# Patient Record
Sex: MALE | Race: OTHER
[De-identification: names, ages, dates, MRNs, and addresses within clinical notes are randomized per-mention and may not be internally consistent; named-entity substitution may affect disease eponyms.]

---

## 2022-08-21 ENCOUNTER — HOSPITAL ENCOUNTER (INPATIENT)
Dept: HOSPITAL 12 - ER | Age: 47
LOS: 3 days | Discharge: HOME HEALTH | DRG: 720 | End: 2022-08-24
Payer: MEDICAID

## 2022-08-21 VITALS — BODY MASS INDEX: 31.22 KG/M2 | HEIGHT: 68 IN | WEIGHT: 206 LBS

## 2022-08-21 VITALS — SYSTOLIC BLOOD PRESSURE: 106 MMHG | DIASTOLIC BLOOD PRESSURE: 71 MMHG

## 2022-08-21 DIAGNOSIS — S81.801A: ICD-10-CM

## 2022-08-21 DIAGNOSIS — L97.819: ICD-10-CM
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
## 2022-08-21 DIAGNOSIS — L02.414: ICD-10-CM

## 2022-08-21 DIAGNOSIS — E11.621: ICD-10-CM

## 2022-08-21 DIAGNOSIS — Z72.89: ICD-10-CM

## 2022-08-21 DIAGNOSIS — B95.0: ICD-10-CM

## 2022-08-21 DIAGNOSIS — L03.114: ICD-10-CM

## 2022-08-21 DIAGNOSIS — D68.69: ICD-10-CM

## 2022-08-21 DIAGNOSIS — Z20.822: ICD-10-CM

## 2022-08-21 DIAGNOSIS — E11.40: ICD-10-CM

## 2022-08-21 DIAGNOSIS — E11.622: ICD-10-CM

## 2022-08-21 DIAGNOSIS — A41.9: Primary | ICD-10-CM
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
## 2022-08-21 DIAGNOSIS — L03.115: ICD-10-CM
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
## 2022-08-21 LAB
ALP SERPL-CCNC: 150 U/L (ref 50–136)
ALT SERPL W/O P-5'-P-CCNC: 18 U/L (ref 16–63)
AST SERPL-CCNC: 16 U/L (ref 15–37)
BILIRUB DIRECT SERPL-MCNC: 0.2 MG/DL (ref 0–0.2)
BILIRUB SERPL-MCNC: 0.8 MG/DL (ref 0.2–1)
BUN SERPL-MCNC: 14 MG/DL (ref 7–18)
CHLORIDE SERPL-SCNC: 92 MMOL/L (ref 98–107)
CO2 SERPL-SCNC: 28 MMOL/L (ref 21–32)
CREAT SERPL-MCNC: 1.2 MG/DL (ref 0.6–1.3)
GLUCOSE SERPL-MCNC: 357 MG/DL (ref 74–106)
HCT VFR BLD AUTO: 37.2 % (ref 36.7–47.1)
MCH RBC QN AUTO: 30.3 UUG (ref 23.8–33.4)
MCV RBC AUTO: 89.2 FL (ref 73–96.2)
PLATELET # BLD AUTO: 253 K/UL (ref 152–348)
POTASSIUM SERPL-SCNC: 3.8 MMOL/L (ref 3.5–5.1)
WS STN SPEC: 8.2 G/DL (ref 6.4–8.2)

## 2022-08-21 PROCEDURE — A4663 DIALYSIS BLOOD PRESSURE CUFF: HCPCS

## 2022-08-21 PROCEDURE — G0378 HOSPITAL OBSERVATION PER HR: HCPCS

## 2022-08-21 RX ADMIN — DEXTROSE SCH MLS/HR: 50 INJECTION, SOLUTION INTRAVENOUS at 17:20

## 2022-08-21 RX ADMIN — HYDROCODONE BITARTRATE AND ACETAMINOPHEN PRN TAB: 5; 325 TABLET ORAL at 10:24

## 2022-08-21 RX ADMIN — SODIUM CHLORIDE PRN MLS/HR: 0.9 INJECTION, SOLUTION INTRAVENOUS at 21:22

## 2022-08-21 RX ADMIN — Medication SCH EACH: at 17:11

## 2022-08-21 RX ADMIN — Medication SCH EACH: at 21:18

## 2022-08-21 RX ADMIN — SODIUM CHLORIDE PRN UNIT: 9 INJECTION, SOLUTION INTRAVENOUS at 21:35

## 2022-08-21 RX ADMIN — HYDROCODONE BITARTRATE AND ACETAMINOPHEN PRN TAB: 5; 325 TABLET ORAL at 21:30

## 2022-08-21 RX ADMIN — PIPERACILLIN SODIUM AND TAZOBACTAM SODIUM SCH MLS/HR: .375; 3 INJECTION, POWDER, LYOPHILIZED, FOR SOLUTION INTRAVENOUS at 21:19

## 2022-08-21 RX ADMIN — SODIUM CHLORIDE PRN UNIT: 9 INJECTION, SOLUTION INTRAVENOUS at 17:30

## 2022-08-21 RX ADMIN — ENOXAPARIN SODIUM SCH MG: 40 INJECTION SUBCUTANEOUS at 09:25

## 2022-08-21 NOTE — NUR
REPORT GIVEN BY CHARGE NURSE TO INCOMING CHARGE NURSE. PATIENT DENIES 
DISCOMFORT. VANCO 1 GM AND X 2 LITER NS BOLUS INFUSED, VOIDED X 1 ON SHIFT. 
TEMP DOWN TO 98.0 FROM 100.2, PATIENT DENIES BEING DIABETIC, 352 BLOOD GLUCOSE 
AND ED [id number] AWARE.

## 2022-08-21 NOTE — NUR
PATIENT IN ED FROM HOME WITH REPORT OF RIGHT LE WOUND 2ND TO SPIDER BITE X 5 
DAYS AGO.RIGHT LEG SWOLLEN ,HOT TO TOUCH AND TENDER WITH PURULENT DRAINAGE 
RUNNING DOWN LEG. SEEN BY DR. HURST AND SET UP FOR I&D.

## 2022-08-22 VITALS — SYSTOLIC BLOOD PRESSURE: 100 MMHG | DIASTOLIC BLOOD PRESSURE: 68 MMHG

## 2022-08-22 VITALS — DIASTOLIC BLOOD PRESSURE: 58 MMHG | SYSTOLIC BLOOD PRESSURE: 94 MMHG

## 2022-08-22 VITALS — DIASTOLIC BLOOD PRESSURE: 62 MMHG | SYSTOLIC BLOOD PRESSURE: 94 MMHG

## 2022-08-22 VITALS — SYSTOLIC BLOOD PRESSURE: 120 MMHG | DIASTOLIC BLOOD PRESSURE: 71 MMHG

## 2022-08-22 LAB
AMPHETAMINES UR QL SCN>1000 NG/ML: POSITIVE
APPEARANCE UR: CLEAR
BILIRUB UR QL STRIP: NEGATIVE
BUN SERPL-MCNC: 9 MG/DL (ref 7–18)
CHLORIDE SERPL-SCNC: 98 MMOL/L (ref 98–107)
CHOLEST SERPL-MCNC: 126 MG/DL (ref ?–200)
CO2 SERPL-SCNC: 27 MMOL/L (ref 21–32)
COCAINE UR QL SCN: NEGATIVE
COLOR UR: YELLOW
CREAT SERPL-MCNC: 1 MG/DL (ref 0.6–1.3)
DEPRECATED SQUAMOUS URNS QL MICRO: (no result) /HPF
GLUCOSE SERPL-MCNC: 299 MG/DL (ref 74–106)
GLUCOSE UR STRIP-MCNC: (no result) MG/DL
HCT VFR BLD AUTO: 33 % (ref 36.7–47.1)
HDLC SERPL-MCNC: 46 MG/DL (ref 40–60)
HGB UR QL STRIP: NEGATIVE
KETONES UR STRIP-MCNC: NEGATIVE MG/DL
LEUKOCYTE ESTERASE UR QL STRIP: NEGATIVE
MAGNESIUM SERPL-MCNC: 1.7 MG/DL (ref 1.8–2.4)
MCH RBC QN AUTO: 30.2 UUG (ref 23.8–33.4)
MCV RBC AUTO: 89.4 FL (ref 73–96.2)
NITRITE UR QL STRIP: NEGATIVE
OPIATES UR QL SCN: POSITIVE
PCP UR QL SCN>25 NG/ML: NEGATIVE
PH UR STRIP: 6 [PH] (ref 5–8)
PLATELET # BLD AUTO: 227 K/UL (ref 152–348)
POTASSIUM SERPL-SCNC: 3.7 MMOL/L (ref 3.5–5.1)
RBC #/AREA URNS HPF: (no result) /HPF (ref 0–3)
SP GR UR STRIP: 1.01 (ref 1–1.03)
THC UR QL SCN>50 NG/ML: NEGATIVE
TRIGL SERPL-MCNC: 59 MG/DL (ref 30–150)
UROBILINOGEN UR STRIP-MCNC: 4 E.U./DL
WBC #/AREA URNS HPF: (no result) /HPF
WBC #/AREA URNS HPF: (no result) /HPF (ref 0–3)

## 2022-08-22 RX ADMIN — Medication SCH EACH: at 21:04

## 2022-08-22 RX ADMIN — SODIUM CHLORIDE PRN UNIT: 9 INJECTION, SOLUTION INTRAVENOUS at 17:20

## 2022-08-22 RX ADMIN — PIPERACILLIN SODIUM AND TAZOBACTAM SODIUM SCH MLS/HR: .375; 3 INJECTION, POWDER, LYOPHILIZED, FOR SOLUTION INTRAVENOUS at 17:11

## 2022-08-22 RX ADMIN — SODIUM CHLORIDE PRN UNIT: 9 INJECTION, SOLUTION INTRAVENOUS at 08:03

## 2022-08-22 RX ADMIN — Medication SCH EACH: at 06:08

## 2022-08-22 RX ADMIN — PANTOPRAZOLE SODIUM SCH MG: 40 TABLET, DELAYED RELEASE ORAL at 06:08

## 2022-08-22 RX ADMIN — SODIUM CHLORIDE PRN UNIT: 9 INJECTION, SOLUTION INTRAVENOUS at 12:17

## 2022-08-22 RX ADMIN — SODIUM CHLORIDE PRN UNIT: 9 INJECTION, SOLUTION INTRAVENOUS at 21:06

## 2022-08-22 RX ADMIN — ENOXAPARIN SODIUM SCH MG: 40 INJECTION SUBCUTANEOUS at 08:04

## 2022-08-22 RX ADMIN — Medication SCH EACH: at 12:15

## 2022-08-22 RX ADMIN — DEXTROSE SCH MLS/HR: 50 INJECTION, SOLUTION INTRAVENOUS at 20:30

## 2022-08-22 RX ADMIN — PIPERACILLIN SODIUM AND TAZOBACTAM SODIUM SCH MLS/HR: .375; 3 INJECTION, POWDER, LYOPHILIZED, FOR SOLUTION INTRAVENOUS at 23:55

## 2022-08-22 RX ADMIN — PIPERACILLIN SODIUM AND TAZOBACTAM SODIUM SCH MLS/HR: .375; 3 INJECTION, POWDER, LYOPHILIZED, FOR SOLUTION INTRAVENOUS at 12:17

## 2022-08-22 RX ADMIN — Medication SCH EACH: at 17:18

## 2022-08-22 RX ADMIN — SODIUM CHLORIDE PRN MLS/HR: 0.9 INJECTION, SOLUTION INTRAVENOUS at 12:17

## 2022-08-22 RX ADMIN — METFORMIN HYDROCHLORIDE SCH MG: 500 TABLET ORAL at 17:18

## 2022-08-22 RX ADMIN — DEXTROSE SCH MLS/HR: 50 INJECTION, SOLUTION INTRAVENOUS at 05:34

## 2022-08-22 RX ADMIN — PIPERACILLIN SODIUM AND TAZOBACTAM SODIUM SCH MLS/HR: .375; 3 INJECTION, POWDER, LYOPHILIZED, FOR SOLUTION INTRAVENOUS at 02:21

## 2022-08-22 NOTE — NUR
NOTIFIED EILEEN MONCADA PROVIDER RE BLOOD SUGAR AT THIS TIME  AND PATIENT IS ON MILD 
SLIDING SCALE AND HE STATED ITS OKAY FOR NOW SINCE HE HAS ADDED METFORMIN AND GLIPIZIDE AND 
NOTED.

## 2022-08-22 NOTE — NUR
RECEIVED PATIENT IN BED AWAKE ALERT AND ORIENTED DENIES PAIN OR DISCOMFORTS AT THIS TIME 
INSULIN GIVEN PER SLIDING SCALE NO S/S OF HYPERGLYCEMIC REACTIONS AT THIS TIME NO S/S OF 
ADVERSE OR ALLERGIC REACTIONS FROM ATB AS ORDERED DRESSING INTACT TO BOTH ELBOWS AND RIGHT 
ARM MADE COMFORTABLE WILL CONTINUE TO OBSERVE.

## 2022-08-22 NOTE — NUR
BLOOD SUGAR AT THIS TIME  WITH REGULAR INSULIN PER COVERAGE NO S/S OF 
HYPO/HYPERGLYCEMIC REACTIONS DENIES PAIN NOR DISCOMFORTS AT THIS TIME RIGHT LOWER EXT 
ELEVATED ON A PILLOW WILL CONTINUE TO OBSERVE.

## 2022-08-22 NOTE — NUR
Admitted to room 318; admission procedures done;  wound care done; VSS; continue to monitor; 
plan of care initiated

## 2022-08-22 NOTE — NUR
DRESSING CHANGED ON RIGHT LOWER LEG AT THE SITE OF INFECTION. CLEANED WITH NORMAL SALINE AND 
APPLIED XEROFOAM AND GAUZE. PT STATED THAT IT DOESN'T HURT AS MUCH. MAINTAINED ELEVATION OF 
RIGHT LOWER LEG.

## 2022-08-23 VITALS — DIASTOLIC BLOOD PRESSURE: 78 MMHG | SYSTOLIC BLOOD PRESSURE: 114 MMHG

## 2022-08-23 VITALS — DIASTOLIC BLOOD PRESSURE: 71 MMHG | SYSTOLIC BLOOD PRESSURE: 105 MMHG

## 2022-08-23 VITALS — DIASTOLIC BLOOD PRESSURE: 65 MMHG | SYSTOLIC BLOOD PRESSURE: 102 MMHG

## 2022-08-23 VITALS — DIASTOLIC BLOOD PRESSURE: 72 MMHG | SYSTOLIC BLOOD PRESSURE: 111 MMHG

## 2022-08-23 LAB
BUN SERPL-MCNC: 7 MG/DL (ref 7–18)
CHLORIDE SERPL-SCNC: 103 MMOL/L (ref 98–107)
CO2 SERPL-SCNC: 30 MMOL/L (ref 21–32)
CREAT SERPL-MCNC: 0.9 MG/DL (ref 0.6–1.3)
GLUCOSE SERPL-MCNC: 177 MG/DL (ref 74–106)
HCT VFR BLD AUTO: 32.5 % (ref 36.7–47.1)
MAGNESIUM SERPL-MCNC: 1.8 MG/DL (ref 1.8–2.4)
MCH RBC QN AUTO: 29.8 UUG (ref 23.8–33.4)
MCV RBC AUTO: 89.2 FL (ref 73–96.2)
PLATELET # BLD AUTO: 260 K/UL (ref 152–348)
POTASSIUM SERPL-SCNC: 3.8 MMOL/L (ref 3.5–5.1)

## 2022-08-23 PROCEDURE — 0JBN0ZZ EXCISION OF RIGHT LOWER LEG SUBCUTANEOUS TISSUE AND FASCIA, OPEN APPROACH: ICD-10-PCS

## 2022-08-23 RX ADMIN — DEXTROSE SCH MLS/HR: 50 INJECTION, SOLUTION INTRAVENOUS at 20:43

## 2022-08-23 RX ADMIN — METFORMIN HYDROCHLORIDE SCH MG: 500 TABLET ORAL at 08:11

## 2022-08-23 RX ADMIN — Medication SCH EACH: at 13:35

## 2022-08-23 RX ADMIN — DEXTROSE SCH MLS/HR: 50 INJECTION, SOLUTION INTRAVENOUS at 13:57

## 2022-08-23 RX ADMIN — SODIUM CHLORIDE PRN MLS/HR: 0.9 INJECTION, SOLUTION INTRAVENOUS at 07:18

## 2022-08-23 RX ADMIN — Medication SCH EACH: at 11:38

## 2022-08-23 RX ADMIN — SODIUM CHLORIDE PRN UNIT: 9 INJECTION, SOLUTION INTRAVENOUS at 07:55

## 2022-08-23 RX ADMIN — METFORMIN HYDROCHLORIDE SCH MG: 500 TABLET ORAL at 17:24

## 2022-08-23 RX ADMIN — Medication SCH EACH: at 16:22

## 2022-08-23 RX ADMIN — SODIUM CHLORIDE PRN UNIT: 9 INJECTION, SOLUTION INTRAVENOUS at 11:39

## 2022-08-23 RX ADMIN — Medication SCH EACH: at 07:18

## 2022-08-23 RX ADMIN — Medication SCH ML: at 17:24

## 2022-08-23 RX ADMIN — PIPERACILLIN SODIUM AND TAZOBACTAM SODIUM SCH MLS/HR: .375; 3 INJECTION, POWDER, LYOPHILIZED, FOR SOLUTION INTRAVENOUS at 05:11

## 2022-08-23 RX ADMIN — PANTOPRAZOLE SODIUM SCH MG: 40 TABLET, DELAYED RELEASE ORAL at 06:40

## 2022-08-23 RX ADMIN — SODIUM CHLORIDE PRN MLS/HR: 0.9 INJECTION, SOLUTION INTRAVENOUS at 21:59

## 2022-08-23 RX ADMIN — SODIUM CHLORIDE PRN UNIT: 9 INJECTION, SOLUTION INTRAVENOUS at 16:20

## 2022-08-23 RX ADMIN — SODIUM CHLORIDE PRN UNIT: 9 INJECTION, SOLUTION INTRAVENOUS at 20:55

## 2022-08-23 RX ADMIN — Medication SCH ML: at 13:35

## 2022-08-23 RX ADMIN — DEXTROSE SCH MLS/HR: 50 INJECTION, SOLUTION INTRAVENOUS at 13:17

## 2022-08-23 RX ADMIN — ENOXAPARIN SODIUM SCH MG: 40 INJECTION SUBCUTANEOUS at 08:13

## 2022-08-23 RX ADMIN — DEXTROSE SCH MLS/HR: 50 INJECTION, SOLUTION INTRAVENOUS at 22:01

## 2022-08-23 RX ADMIN — Medication SCH EACH: at 20:54

## 2022-08-23 RX ADMIN — Medication SCH EACH: at 17:23

## 2022-08-23 RX ADMIN — HYDROCODONE BITARTRATE AND ACETAMINOPHEN PRN TAB: 5; 325 TABLET ORAL at 15:12

## 2022-08-23 NOTE — NUR
MEDICATED WITH [facility] FOR PAIN AS REQUESTED RIGHT LEG ELEVATED ON A PILLOW NOT IN DISTRESS 
WILL CONTINUE TO OBSERVE.

## 2022-08-23 NOTE — NUR
DR IZQUIERDO HERE AND SEEN PATIENT AND DID BESIDE DEBRIDEMENT AFTER CONSCENT SIGNED WITH ORDERS 
AND NOTED PATIENT TOLERATED WELL.

## 2022-08-23 NOTE — NUR
Social work consult was requested for a patient on medsu for substance abuse resources. 
Patient is 46-year-old  male admitted to the hospital for cellulitis. Patient is 
Korean speaking and SW had nurse, yessenia Warren. Patient is alert and oriented X3. 
Patient presents with anxious mood and congruent affect. Patient states his primary contact 
person is wife, Melissa Larose ([phone]) and she lives in [place]. Patient states they 
have a good relationship. Patient states that he lives at [address] [address] 
in an apartment with his 22-year-old daughter, Jing ([phone]).  Patient states that he 
is currently unemployed. Patient states he does not have any medical equipment at home and 
is not currently driving.  



Patient states that he has a history of alcohol abuse. The toxicology screening reports 
positive opiates and amphetamines. SW provided the patient resources for substance abuse 
from [address] [address] ([phone]), [facility] 
[address] [facility] [address] ([phone]), and [address] [address] ([phone]). SW also provided the number for alcoholics 
anonymous ([phone]). Patient appears in the ambivalent about treatment. Patient stated 
that he would potentially be interested in looking at treatment centers that the SW 
provided. SW placed the resources in the patients chart. Patient denies a history of a 
psychiatric diagnosis. Patient denies suicidal or homicidal ideation.  The discharge plan is 
for his daughter, Jing ([phone]) to take him to [address] [address] at 
discharge.

## 2022-08-23 NOTE — NUR
WOUND CARE CONSULT: PT PRESENTS WITH HEALING AREAS TO BILATERAL ELBOWS AND WOUND TO RT LOWER 
LEG, PRESENT ON ADMISSION. PACKING STRIP REMOVED FROM RT LOWER LEG. WOUND NOTED TO HAVE 
SEROSANGUINOUS DRAINAGE, NO ODOR BUT SOME REDNESS NOTED. DR OLIVAS NOTIFIED OF DPM CONSULT 
REQUEST. DISCUSSED WOUND CARE OF ELBOWS WITH NURSING STAFF. HEALED AREA NOTED TO LEFT 
BUTTOCK. MD IN AGREEMENT WITH PLAN OF CARE.

## 2022-08-24 VITALS — SYSTOLIC BLOOD PRESSURE: 109 MMHG | DIASTOLIC BLOOD PRESSURE: 70 MMHG

## 2022-08-24 VITALS — DIASTOLIC BLOOD PRESSURE: 75 MMHG | SYSTOLIC BLOOD PRESSURE: 113 MMHG

## 2022-08-24 VITALS — SYSTOLIC BLOOD PRESSURE: 106 MMHG | DIASTOLIC BLOOD PRESSURE: 69 MMHG

## 2022-08-24 RX ADMIN — DEXTROSE SCH MLS/HR: 50 INJECTION, SOLUTION INTRAVENOUS at 05:28

## 2022-08-24 RX ADMIN — Medication SCH EACH: at 16:30

## 2022-08-24 RX ADMIN — Medication SCH ML: at 08:40

## 2022-08-24 RX ADMIN — PANTOPRAZOLE SODIUM SCH MG: 40 TABLET, DELAYED RELEASE ORAL at 06:08

## 2022-08-24 RX ADMIN — ENOXAPARIN SODIUM SCH MG: 40 INJECTION SUBCUTANEOUS at 08:39

## 2022-08-24 RX ADMIN — Medication SCH ML: at 16:31

## 2022-08-24 RX ADMIN — DEXTROSE SCH MLS/HR: 50 INJECTION, SOLUTION INTRAVENOUS at 04:34

## 2022-08-24 RX ADMIN — Medication SCH EACH: at 08:39

## 2022-08-24 RX ADMIN — DEXTROSE SCH MLS/HR: 50 INJECTION, SOLUTION INTRAVENOUS at 12:16

## 2022-08-24 RX ADMIN — Medication SCH EACH: at 11:08

## 2022-08-24 RX ADMIN — DEXTROSE SCH MLS/HR: 50 INJECTION, SOLUTION INTRAVENOUS at 13:26

## 2022-08-24 RX ADMIN — HYDROCODONE BITARTRATE AND ACETAMINOPHEN PRN TAB: 5; 325 TABLET ORAL at 08:38

## 2022-08-24 RX ADMIN — HYDROCODONE BITARTRATE AND ACETAMINOPHEN PRN TAB: 5; 325 TABLET ORAL at 16:31

## 2022-08-24 RX ADMIN — Medication SCH EACH: at 06:08

## 2022-08-24 RX ADMIN — SODIUM CHLORIDE PRN UNIT: 9 INJECTION, SOLUTION INTRAVENOUS at 12:00

## 2022-08-24 RX ADMIN — SODIUM CHLORIDE PRN UNIT: 9 INJECTION, SOLUTION INTRAVENOUS at 08:05

## 2022-08-24 RX ADMIN — SODIUM CHLORIDE PRN UNIT: 9 INJECTION, SOLUTION INTRAVENOUS at 16:32

## 2022-08-24 NOTE — NUR
ASLEEP EASILY AROUSABLE ON ROUNDS ON ROOM AIR WITH NO SOB AT THIS TIME NO S/S OF 
HUPO/HYPERGLYCEMIC REACTIONS AT THIS TIME IVF IS IN PROGRESS AS ORDERED IV ANTIBIOTICS IN 
PROGRESS WITH NO ADVERSE OR ALLERGIC REACTIONS AT THIS TIME DRESSING DRY AND INTACT TO BOTH 
UPPER EXT AND RIGHT LEG.RIGHT LEG ELEVATED ON THE PILLOW TO REDUCE SWELLING CALL LIGHTS AND 
PERSONAL BELONGINGS ARE WITHIN EASY REACH AT THIS TIME WILL CONTINUE TO OBSERVE.

## 2022-08-24 NOTE — NUR
Discharged home accompanied by sister Jing with walker and supplies for wound care. 
Instructed to  home meds from [facility] pharmacy. Explained [facility] health will see 
patient twice for wound care instructions. Left in stable condition, not in any form of 
distress.

## 2022-08-24 NOTE — NUR
STATED THAT [facility] WILL BE ABLE TO VISIT PATIENT A FEW TIMES ONLY 
DUE TO INSURANCE ISSUES.HE SPOKE WITH THE PATIENT AND GAVE HIM ALL THE RESOURCES AND 
INSTRUCTIONS TO FOLLOW UP WITH [facility] FOR CONTINUATION OF WOUND CARE AND MANAGEMENT OS 
HIS DIABETES AND HE EXPRESSED UNDERSTANDING.D/C INSTRUCTIONS GIVEN AND PATIENT STATED THAT 
HIS DAUGHTER HAY WILL BE HERE SHORTLY TO GIVE HIM A RIDE HOME.EXTRA WOUND DRESSING 
MATERIALS AND A WALKER GIVEN TO PATIENT AWAITING NOW FOR HIS DAUGHTER TO PICK HIM UP.

## 2022-08-24 NOTE — NUR
MICHEL ALEJANDRA HERE SEEN PATIENT AND STATED MAY DISCHARGE PATIENT HOME TODAY WITH HOME HEALTH 
STATED WILL WAIT FOR THE INFECTIOUS DISEASE DOCTOR TO DETERMINE WHICH ANTIBIOTICS HE WILL BE 
DISCHARGED ON.PATIENT AWARE AWAITING FOR ORDERS.